# Patient Record
Sex: FEMALE | Race: WHITE | Employment: OTHER | URBAN - METROPOLITAN AREA
[De-identification: names, ages, dates, MRNs, and addresses within clinical notes are randomized per-mention and may not be internally consistent; named-entity substitution may affect disease eponyms.]

---

## 2017-01-23 PROCEDURE — 88305 TISSUE EXAM BY PATHOLOGIST: CPT | Performed by: INTERNAL MEDICINE

## 2017-01-26 PROBLEM — D36.9 TUBULAR ADENOMA: Status: ACTIVE | Noted: 2017-01-26

## 2017-02-06 PROBLEM — M54.9 UPPER BACK PAIN: Status: ACTIVE | Noted: 2017-02-06

## 2018-08-27 PROBLEM — N95.2 ATROPHIC VAGINITIS: Status: ACTIVE | Noted: 2018-08-27

## 2018-08-27 PROBLEM — M81.0 AGE-RELATED OSTEOPOROSIS WITHOUT CURRENT PATHOLOGICAL FRACTURE: Status: ACTIVE | Noted: 2018-08-27

## 2018-08-27 PROCEDURE — 88175 CYTOPATH C/V AUTO FLUID REDO: CPT | Performed by: OBSTETRICS & GYNECOLOGY

## 2019-01-07 ENCOUNTER — HOSPITAL ENCOUNTER (OUTPATIENT)
Dept: CT IMAGING | Age: 59
Discharge: HOME | End: 2019-01-07
Attending: INTERNAL MEDICINE

## 2019-01-07 DIAGNOSIS — Z13.9 SCREENING PROCEDURE: ICD-10-CM

## 2019-01-07 NOTE — PROGRESS NOTES
Pt seen at [facility], [facility] for CTHS:  PRELIMINARY SCORE=0  LC=157/78    [facility] labs as follows:  VI=670  HDL=51  HUB=228  GG=630  GLUCOSE=82; non-fasting    All results and risk factors discussed with patient; all questions and concerns addressed.   Maicol Llanes

## 2022-03-03 ENCOUNTER — HOSPITAL ENCOUNTER (OUTPATIENT)
Dept: MAMMOGRAPHY | Facility: HOSPITAL | Age: 62
Discharge: HOME | End: 2022-03-03
Attending: INTERNAL MEDICINE
Payer: COMMERCIAL

## 2022-03-03 DIAGNOSIS — N64.89 BREAST ASYMMETRY: ICD-10-CM

## 2022-03-03 DIAGNOSIS — N63.20 BREAST MASS, LEFT: ICD-10-CM

## 2022-03-03 PROCEDURE — 88305 TISSUE EXAM BY PATHOLOGIST: CPT | Performed by: OBSTETRICS & GYNECOLOGY

## 2022-03-03 PROCEDURE — 19081 BX BREAST 1ST LESION STRTCTC: CPT | Performed by: OBSTETRICS & GYNECOLOGY

## 2022-03-03 PROCEDURE — 88342 IMHCHEM/IMCYTCHM 1ST ANTB: CPT | Performed by: OBSTETRICS & GYNECOLOGY

## 2022-03-03 PROCEDURE — 88341 IMHCHEM/IMCYTCHM EA ADD ANTB: CPT | Performed by: OBSTETRICS & GYNECOLOGY

## 2022-03-03 NOTE — PROCEDURES
[facility]  Procedure Note    Selin Montes Patient Status:  Outpatient    1960 MRN [id number]   Location [address] [place] [address] Attending Prema Onofre MD   Hosp Day # 0 PCP Yenifer Pascual MD     Procedure: tomosynthesis guided biopsy of the left breast    Pre-Procedure Diagnosis:  Left breast central asymmetry    Post-Procedure Diagnosis: Left breast central asymmetry    Anesthesia:  Local    Findings:  Left breast central asymmetry    Specimens: 6    Blood Loss:  minimal    Tourniquet Time: none  Complications:  None  Drains:  None    Heber Strong DO  3/3/2022

## 2022-03-03 NOTE — IMAGING NOTE
History taken and as follows: Came from an outside faciliy to have jocelyne bx done here for an asymetry     Colby here to explain risk and benefits of procedure. Questions answered by the cucocian    715 am  Pt consented at  [facility] [address]    Placed in chair  at  730 am scouts taken    740 amTime out taken    745 amChloro prep as skin prep. Lidocaine 1% 10  Milligrams per ml 5 ml given for superficial anesthetic affect at     750 amLidocaine  1% with epinephrine  1:100,000 units for deeper anesthetic affect  15 ML given. More images taken after local  was given. Sampling begins at 753 am    Sampling complete at  755 am  Core tainer taken to be imaged. 757 am  Formalin added to samples. 755 am    Stoplight clip inserted . Procedure completed . Pressure to site manually by nurse  and by machine compression for 10 minutes . No active bleeding noted. Area cleaned steri strips to site . Ice pack to site . 805 am Cores taken to pathology by [facility] Tech aide given to [facility]    813 am post clip images  By Terri Gillette 178. 815 am  .  Dressing dry and intact . Nipple markers removed by pt  Bra applied per patient. 6\" ace secured over bra by nurse. 818 am Post instructions  Given verbal et written AVS  summary sheet provided to patient. Patient verbalizes understanding and agreement. 820 am Pt discharged ambulatory to spouse Bulmaro Abdullahi.

## 2022-03-07 ENCOUNTER — TELEPHONE (OUTPATIENT)
Dept: MAMMOGRAPHY | Facility: HOSPITAL | Age: 62
End: 2022-03-07

## 2022-03-07 NOTE — TELEPHONE ENCOUNTER
No diagnosis found. Amanda Wilcox s/p biopsy . Phoned and introduced myself as breast coordinator . Reinforced to patient  post biopsy care and instructions . Informed  and shared the pathology results as well as the recommendations from Dr Cynthia Timmons for her breast imaging  as follows:    PATHOLOGY:     Final Diagnosis:   Left breast, central; stereotactic/tomosynthesis core biopsy:   * Breast tissue with benign fibrocystic changes, including microcyst formation, columnar cell change, and usual ductal hyperplasia. * No evidence of malignancy.                 =====  CONCLUSION: Uneventful tomosynthesis and stereotactic guided biopsy of the LEFT breast performed without immediate complication and marked with a TOPHAT shaped clip in appropriate positioning. RECOMMENDATION:     Pathology results are considered benign and concordant. Patient may return to annual screening mammogram.          Dictated by (CST): Christine Bush DO on 3/03/2022 at 10:08 AM       Finalized by (CST): Terri Strong [place] [id number], DO on 3/07/2022 at [address] [place] [address],  acknowledges the above and denies questions. She was also instructed to perform breast self exams and if she develops any changes to contact her physician immediately  for re evaluation. Dipti Butler verbalizes understanding and agreement.
skin normal color for race, warm, dry and intact.

## 2023-05-31 ENCOUNTER — HOSPITAL ENCOUNTER (OUTPATIENT)
Age: 63
Discharge: HOME | End: 2023-05-31
Attending: EMERGENCY MEDICINE
Payer: MEDICARE

## 2023-05-31 VITALS
SYSTOLIC BLOOD PRESSURE: 153 MMHG | RESPIRATION RATE: 19 BRPM | DIASTOLIC BLOOD PRESSURE: 87 MMHG | TEMPERATURE: 98 F | HEART RATE: 82 BPM | OXYGEN SATURATION: 98 %

## 2023-05-31 DIAGNOSIS — S00.83XA CONTUSION OF FACE, INITIAL ENCOUNTER: Primary | ICD-10-CM

## 2023-05-31 PROCEDURE — 99203 OFFICE O/P NEW LOW 30 MIN: CPT

## 2023-05-31 PROCEDURE — 99212 OFFICE O/P EST SF 10 MIN: CPT
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
## 2025-02-06 ENCOUNTER — HOSPITAL ENCOUNTER (OUTPATIENT)
Age: 65
Discharge: HOME | End: 2025-02-06
Payer: MEDICARE

## 2025-02-06 VITALS
DIASTOLIC BLOOD PRESSURE: 78 MMHG | RESPIRATION RATE: 18 BRPM | HEART RATE: 94 BPM | SYSTOLIC BLOOD PRESSURE: 135 MMHG | TEMPERATURE: 98 F | OXYGEN SATURATION: 98 %

## 2025-02-06 DIAGNOSIS — J02.0 STREP PHARYNGITIS: Primary | ICD-10-CM

## 2025-02-06 LAB — S PYO AG THROAT QL IA.RAPID: POSITIVE

## 2025-02-06 PROCEDURE — 87651 STREP A DNA AMP PROBE: CPT | Performed by: PHYSICIAN ASSISTANT

## 2025-02-06 PROCEDURE — 99213 OFFICE O/P EST LOW 20 MIN: CPT

## 2025-02-06 PROCEDURE — 99214 OFFICE O/P EST MOD 30 MIN: CPT

## 2025-02-06 RX ORDER — METRONIDAZOLE 500 MG/1
500 TABLET ORAL 2 TIMES DAILY
COMMUNITY
Start: 2025-02-05 | End: 2025-02-12

## 2025-02-06 NOTE — ED INITIAL ASSESSMENT (HPI)
Sore throat since yesterday, no fever, no cough , + sick exposure to strep, pt on flagyl for a vaginal infection

## 2025-02-06 NOTE — ED PROVIDER NOTES
Chief Complaint   Patient presents with    Sore Throat       History obtained from: patient   services not used     HPI:     Amanda Washington is a 65 year old female who presents with sore throat since yesterday.  Patient states her grandson and  recently tested positive for strep.  Patient has no other symptoms or complaints at this time and states she is otherwise feeling well.  Denies fever, chills, facial or neck swelling, drooling, voice change, chest pain, shortness of breath, cough, headaches, neck stiffness, abdominal pain, vomiting, rash.    PMH  Past Medical History:    HYPERLIPIDEMIA    remains elevated 1/2016, Dr Davey    Osteopenia    family history, also low Vit D    Tubular adenoma 1/17    colonosocpy - repeat 2022    Vitamin D deficiency     remains low 1/2016,  Dr Davey       [facility]  Nurses notes reviewed I agree with documentation.    Family History   Problem Relation Age of Onset    Cancer Other         breast    Dementia Mother     Cancer Paternal Grandmother     Breast Cancer Paternal Grandmother 73        DX AGE 73    Diabetes Father     Heart Disorder Father     Neurological Disorder Sister         Sharon     Family history reviewed with patient/caregiver and is not pertinent to presenting problem.  Social History     Socioeconomic History    Marital status:      Spouse name: Not on file    Number of children: Not on file    Years of education: Not on file    Highest education level: Not on file   Occupational History    Not on file   Tobacco Use    Smoking status: Never    Smokeless tobacco: Never   Vaping Use    Vaping status: Never Used   Substance and Sexual Activity    Alcohol use: No    Drug use: No    Sexual activity: Yes     Partners: Male     Birth control/protection: Surgical, Tubal Ligation, Hysterectomy   Other Topics Concern    Not on file   Social History Narrative        2 children    Owns restaurant         mammo - 11/13    Colon - due    DEXA - osteopenia 8/12     Social Drivers of Health     Food Insecurity: Not on file   Transportation Needs: Not on file   Housing Stability: Not on file         ROS:   Positive for stated complaint: Sore throat  Other systems are as noted in HPI.   All other systems reviewed and negative except as noted above.    Physical Exam:   Vital signs and nursing note reviewed.       /78   Pulse 94   Temp 97.7 °F (36.5 °C) (Oral)   Resp 18   SpO2 98%     GENERAL: well developed, no acute distress, non-toxic appearing   SKIN: good skin turgor, no obvious rashes  HEAD: normocephalic, atraumatic  EYES: sclera non-icteric bilaterally, conjunctiva clear bilaterally  EARS: canals clear bilaterally, TMs clear bilaterally  NOSE: nasal turbinates pink, normal mucosa  OROPHARYNX: MMM, pharynx erythematous, no exudates or swelling, uvula midline, no tongue elevation, maintaining airway and secretions  NECK: supple, no lymphadenopathy, no nuchal rigidity, no trismus, no edema, phonation normal    CARDIO: RRR, normal heart sounds   LUNGS: clear to auscultation bilaterally, no increased WOB, no rales, rhonchi, or wheezes  EXTREMITIES: no cyanosis or edema, CABRERA without difficulty  NEURO: no focal deficits  PSYCH: alert and oriented x3, answering questions appropriately, mood appropriate    MDM/Assessment/Plan:   Orders for this encounter:    Orders Placed This Encounter    Rapid Strep A - ID NOW     Order Specific Question:   Release to patient     Answer:   Immediate    amoxicillin clavulanate 875-125 MG Oral Tab     Sig: Take 1 tablet by mouth 2 (two) times daily for 10 days.     Dispense:  20 tablet     Refill:  0       Labs performed this visit:  Recent Results (from the past 10 hours)   Rapid Strep A - ID NOW    Collection Time: 02/06/25 10:21 AM    Specimen: Throat; Other   Result Value Ref Range    Strep A by PCR Positive (A) Negative       Imaging performed this visit:  No orders to  display       Medical Decision Making  DDx includes strep pharyngitis versus viral pharyngitis versus viral URI versus other.  Patient is overall very well-appearing with stable vitals and tolerating oral intake.  No signs of PTA.  Strep positive.  Rx Augmentin given that patient recently took course of amoxicillin last month.  Patient states she started Flagyl yesterday for vaginal irritation however on chart review patient's test results are negative for BV/yeast/trichomonas therefore recommend patient discuss with her gynecologist if she needs to continue taking Flagyl at this time.  Also recommend patient start taking a probiotic.  Discussed supportive care including rest, increased fluid intake, and OTC Tylenol/Motrin as needed for pain or fevers.  Discussed infection control.  Instructed patient's mother to bring patient directly to nearest ER with any worsening or concerning symptoms.  Follow-up with PCP.    Amount and/or Complexity of Data Reviewed  Labs: ordered.    Risk  OTC drugs.  Prescription drug management.          Diagnosis:    ICD-10-CM    1. Strep pharyngitis  J02.0           All results reviewed and discussed with patient/patient's family. Patient/patient's family verbalize excellent understanding of instructions and feels comfortable with plan. All of patient's/patient's family's questions were addressed.   See AVS for detailed discharge instructions for your condition today.    Follow Up with:  Violeta Davey MD  [address]  [address]  [facility] [address] [phone]            Note: This document was dictated using Dragon medical dictation software.  Proofreading was performed to the best of my ability, but errors may be present.    Naomie Arizmendi PA-C

## 2025-02-06 NOTE — DISCHARGE INSTRUCTIONS
Complete entire course of antibiotic as directed   Start taking a probiotic (I.e. Florastor) to prevent GI upset   Alternate Tylenol (acetaminophen) / Motrin (ibuprofen) every 3 hours as needed for pain or fever > 100.4   Drink plenty of fluids including warm tea with honey/lemon   Get plenty of rest     You may benefit from using a humidifier  Avoid having air blow on your face    Wash hands often  Throw away your current toothbrush in 24 hours and get a new one  Disinfect your environment  Do not share utensils or drinks    Follow up with your primary care provider    If you experience severe/worsening pain, difficulty swallowing or breathing, facial or neck swelling, drooling, change in voice, or any other concerning symptoms, go to nearest ER immediately

## (undated) NOTE — LETTER
[facility], [place]  Authorization for Invasive Procedures  1. I hereby authorize Dr. Kael Ortiz , my physician and whomever may be designated as the doctor's assistant, to perform the following operation and/or procedure:  Stereotactic biopsy with tomosynthesis of the left breast with clip placement on Carrillo Noriega at [facility].    2. My physician has explained to me the nature and purpose of the operation or other procedure, possible alternative methods of treatment, the risks involved and the possibility of complications to me. I understand the probable consequences of declining the recommended procedure and the alternative methods of treatment. I acknowledge that no guarantee has been made as to the result that may be obtained. 3. I recognize that during the course of this operation or other procedure, unforeseen conditions may necessitate additional or different procedures than those listed above. I, therefore, further authorize and request that the above-named physician, his/her physician assistants, or designees perform such procedures as are, in his/her professional opinion, necessary and desirable. If I have a Do Not Attempt Resuscitation (DNAR) order in place, that status will be suspended while in the operating room, procedural suite, and during the recovery period unless otherwise explicitly stated by me (or a person authorized to consent on my behalf). The surgeon or my attending physician will determine when the applicable recovery period ends for purposes of reinstating the DNAR order. 4. Should the need arise during my operation or immediate post-operative period; I also consent to the administration of blood and/or blood products.  Further, I understand that despite careful testing and screening of blood and blood products, I may still be subject to ill effects as a result of recieving a blood transfusion an/or blood producst. The following are some, but not all, of the potential risks that can occur: fever and allergic reactions, hemolytic reactions, transmission of disease such as hepatitis, AIDS, cytomegalovirus (CMV), and flluid overload. In the event that I wish to have autologous transfusions of my own blood, or a directed donor transfusion, I will discuss this with my physician. 5. I consent to the photographing of the operations or procedures to be performed for the purposes of advancing medicine, science, and/or education, provided my identity is not revealed. If the procedure has been videotaped, the physician/surgeon will obtain the original videotape. [facility] will not be responsible for storage or maintenance of this tape. 6. I consent to the presence of a  or observer as deemed necessary by my physician or his designee. 7. Any tissues or organs removed in the operation or other procedure may be disposed of by and at the discretion of [facility].    8. I understand that the physician and his/her physician assistants may not be employees or agents of [facility], [facility], nor [facility], but are independent medical practitioners who have been permitted to use its facilities for the care and treatment of their patients. 9. Patients having a sterilization procedure: I understand that if the procedure is successful the results will be permanent and it will therefore be impossible for me to inseminate, conceive or bear children. I also understand that the procedure is intended to result in sterility, although the result has not been guaranteed. 10. I CERTIFY THAT I HAVE READ AND FULLY UNDERSTAND THE ABOVE CONSENT TO OPERATION and/or OTHER PROCEDURE. 11. I acknowledge that my physician has explained sedation/analgesia administration to me including the risks and benefits.  I consent to the administration of sedation/analgesia as may be necessary or desirable in the judgment of my physician. Signature of Patient:  ________________________________________________ Date: _________Time: _________    Responsible person in case of minor or unconscious: _____________________________Relationship: ____________     Witness Signature: ____________________________________________ Date: __________ Time: ___________    Statement of Physician  My signature below affirms that prior to the time of the procedure, I have explained to the patient and/or her legal representative, the risks and benefits involved in the proposed treatment and any reasonable alternative to the proposed treatment. I have also explained the risks and benefits involved in the refusal of the proposed treatment and have answered the patient's questions. If I have a significant financial interest in this procedure/surgery, I have disclosed this and had a discussion with my patient.     Signature of Physician:   ________________________________________Date: _________Time:_______ Patient Name: Kaci Salas  : 1960   Printed: 2022    Medical Record #: [id number]